# Patient Record
Sex: FEMALE | Race: WHITE | Employment: FULL TIME | URBAN - NONMETROPOLITAN AREA
[De-identification: names, ages, dates, MRNs, and addresses within clinical notes are randomized per-mention and may not be internally consistent; named-entity substitution may affect disease eponyms.]

---

## 2017-02-08 ENCOUNTER — OFFICE VISIT (OUTPATIENT)
Dept: FAMILY MEDICINE CLINIC | Age: 37
End: 2017-02-08

## 2017-02-08 VITALS
BODY MASS INDEX: 26.56 KG/M2 | DIASTOLIC BLOOD PRESSURE: 66 MMHG | SYSTOLIC BLOOD PRESSURE: 116 MMHG | WEIGHT: 136 LBS | RESPIRATION RATE: 17 BRPM | HEART RATE: 100 BPM

## 2017-02-08 DIAGNOSIS — H65.04 RECURRENT ACUTE SEROUS OTITIS MEDIA OF RIGHT EAR: ICD-10-CM

## 2017-02-08 DIAGNOSIS — G56.01 CARPAL TUNNEL SYNDROME OF RIGHT WRIST: ICD-10-CM

## 2017-02-08 DIAGNOSIS — R51.9 SEVERE HEADACHE: ICD-10-CM

## 2017-02-08 DIAGNOSIS — M54.2 NECK PAIN: Primary | ICD-10-CM

## 2017-02-08 DIAGNOSIS — Z72.0 TOBACCO USE: ICD-10-CM

## 2017-02-08 PROCEDURE — 99214 OFFICE O/P EST MOD 30 MIN: CPT | Performed by: FAMILY MEDICINE

## 2017-02-08 RX ORDER — OMEGA-3/DHA/EPA/FISH OIL 60 MG-90MG
CAPSULE ORAL
COMMUNITY

## 2017-02-08 RX ORDER — CHOLECALCIFEROL (VITAMIN D3) 125 MCG
500 CAPSULE ORAL DAILY
COMMUNITY
End: 2018-03-27

## 2017-02-08 RX ORDER — TRAMADOL HYDROCHLORIDE 50 MG/1
50 TABLET ORAL EVERY 6 HOURS PRN
Qty: 60 TABLET | Refills: 2 | Status: SHIPPED | OUTPATIENT
Start: 2017-02-08 | End: 2017-05-31 | Stop reason: SDUPTHER

## 2017-02-08 RX ORDER — FLUTICASONE PROPIONATE 50 MCG
2 SPRAY, SUSPENSION (ML) NASAL DAILY
Qty: 1 BOTTLE | Refills: 3 | Status: SHIPPED | OUTPATIENT
Start: 2017-02-08 | End: 2018-03-27

## 2017-02-08 ASSESSMENT — ENCOUNTER SYMPTOMS
ABDOMINAL PAIN: 0
CONSTIPATION: 0
WHEEZING: 0
COUGH: 0
BACK PAIN: 0
SORE THROAT: 0
SINUS PRESSURE: 0
CHEST TIGHTNESS: 0
NAUSEA: 0
SHORTNESS OF BREATH: 0
DIARRHEA: 0
VOMITING: 0
BLOOD IN STOOL: 0
RHINORRHEA: 0

## 2017-05-31 ENCOUNTER — OFFICE VISIT (OUTPATIENT)
Dept: FAMILY MEDICINE CLINIC | Age: 37
End: 2017-05-31

## 2017-05-31 VITALS
DIASTOLIC BLOOD PRESSURE: 68 MMHG | RESPIRATION RATE: 15 BRPM | SYSTOLIC BLOOD PRESSURE: 116 MMHG | BODY MASS INDEX: 28.43 KG/M2 | HEART RATE: 64 BPM | HEIGHT: 59 IN | WEIGHT: 141 LBS

## 2017-05-31 DIAGNOSIS — Z11.4 SCREENING FOR HIV WITHOUT PRESENCE OF RISK FACTORS: ICD-10-CM

## 2017-05-31 DIAGNOSIS — R51.9 SEVERE HEADACHE: ICD-10-CM

## 2017-05-31 DIAGNOSIS — G25.81 RESTLESS LEG SYNDROME: ICD-10-CM

## 2017-05-31 DIAGNOSIS — Z11.1 ENCOUNTER FOR PPD TEST: ICD-10-CM

## 2017-05-31 DIAGNOSIS — M54.2 NECK PAIN: Primary | ICD-10-CM

## 2017-05-31 DIAGNOSIS — Z23 NEED FOR PROPHYLACTIC VACCINATION AGAINST DIPHTHERIA-TETANUS-PERTUSSIS (DTP): ICD-10-CM

## 2017-05-31 PROCEDURE — 90471 IMMUNIZATION ADMIN: CPT | Performed by: FAMILY MEDICINE

## 2017-05-31 PROCEDURE — 90715 TDAP VACCINE 7 YRS/> IM: CPT | Performed by: FAMILY MEDICINE

## 2017-05-31 PROCEDURE — 86580 TB INTRADERMAL TEST: CPT | Performed by: FAMILY MEDICINE

## 2017-05-31 PROCEDURE — 99214 OFFICE O/P EST MOD 30 MIN: CPT | Performed by: FAMILY MEDICINE

## 2017-05-31 RX ORDER — TRAMADOL HYDROCHLORIDE 50 MG/1
50 TABLET ORAL EVERY 6 HOURS PRN
Qty: 60 TABLET | Refills: 2 | Status: SHIPPED | OUTPATIENT
Start: 2017-05-31 | End: 2021-05-18

## 2017-05-31 RX ORDER — ROPINIROLE 0.5 MG/1
0.5 TABLET, FILM COATED ORAL NIGHTLY
Qty: 30 TABLET | Refills: 11 | Status: SHIPPED | OUTPATIENT
Start: 2017-05-31 | End: 2021-05-18

## 2017-05-31 ASSESSMENT — ENCOUNTER SYMPTOMS
BLOOD IN STOOL: 0
CONSTIPATION: 0
BACK PAIN: 0
WHEEZING: 0
CHEST TIGHTNESS: 0
VOMITING: 0
SORE THROAT: 0
ABDOMINAL PAIN: 0
COUGH: 0
SINUS PRESSURE: 0
NAUSEA: 0
DIARRHEA: 0
SHORTNESS OF BREATH: 0
RHINORRHEA: 0

## 2017-08-24 ENCOUNTER — HOSPITAL ENCOUNTER (OUTPATIENT)
Dept: LAB | Age: 37
Discharge: HOME | End: 2017-08-24
Attending: NURSE PRACTITIONER | Admitting: NURSE PRACTITIONER

## 2017-08-24 LAB
ALT SERPL-CCNC: 14 U/L (ref 10–40)
ANION GAP SERPL CALCULATED.3IONS-SCNC: 7 MMOL/L (ref 4–16)
BASOPHILS ABSOLUTE: 0 K/CU MM
BASOPHILS RELATIVE PERCENT: 0.2 % (ref 0–1)
BUN BLDV-MCNC: 15 MG/DL (ref 6–23)
CALCIUM SERPL-MCNC: 9.2 MG/DL (ref 8.3–10.6)
CHLORIDE BLD-SCNC: 106 MMOL/L (ref 99–110)
CHOLESTEROL, FASTING: 209 MG/DL
CO2: 26 MMOL/L (ref 21–32)
CREAT SERPL-MCNC: 0.7 MG/DL (ref 0.6–1.1)
DIFFERENTIAL TYPE: ABNORMAL
EOSINOPHILS ABSOLUTE: 0.3 K/CU MM
EOSINOPHILS RELATIVE PERCENT: 3.2 % (ref 0–3)
ESTIMATED AVERAGE GLUCOSE: 97 MG/DL
GFR AFRICAN AMERICAN: >60 ML/MIN/1.73M2
GFR NON-AFRICAN AMERICAN: >60 ML/MIN/1.73M2
GLUCOSE FASTING: 94 MG/DL (ref 70–99)
HBA1C MFR BLD: 5 % (ref 4.2–6.3)
HCT VFR BLD CALC: 42.5 % (ref 37–47)
HDLC SERPL-MCNC: 74 MG/DL
HEMOGLOBIN: 14.2 GM/DL (ref 12.5–16)
IMMATURE NEUTROPHIL %: 0.9 % (ref 0–0.43)
LDL CHOLESTEROL DIRECT: 113 MG/DL
LYMPHOCYTES ABSOLUTE: 2.7 K/CU MM
LYMPHOCYTES RELATIVE PERCENT: 31.5 % (ref 24–44)
MCH RBC QN AUTO: 29.8 PG (ref 27–31)
MCHC RBC AUTO-ENTMCNC: 33.4 % (ref 32–36)
MCV RBC AUTO: 89.1 FL (ref 78–100)
MONOCYTES ABSOLUTE: 0.5 K/CU MM
MONOCYTES RELATIVE PERCENT: 5.7 % (ref 0–4)
PDW BLD-RTO: 12.8 % (ref 11.7–14.9)
PLATELET # BLD: 322 K/CU MM (ref 140–440)
PMV BLD AUTO: 10.7 FL (ref 7.5–11.1)
POTASSIUM SERPL-SCNC: 4.3 MMOL/L (ref 3.5–5.1)
RBC # BLD: 4.77 M/CU MM (ref 4.2–5.4)
SEGMENTED NEUTROPHILS ABSOLUTE COUNT: 4.9 K/CU MM
SEGMENTED NEUTROPHILS RELATIVE PERCENT: 58.5 % (ref 36–66)
SODIUM BLD-SCNC: 139 MMOL/L (ref 135–145)
TOTAL IMMATURE NEUTOROPHIL: 0.08 K/CU MM
TRIGLYCERIDE, FASTING: 108 MG/DL
TSH HIGH SENSITIVITY: 1.49 UIU/ML (ref 0.27–4.2)
VITAMIN D 25-HYDROXY: 37.11 NG/ML
WBC # BLD: 8.5 K/CU MM (ref 4–10.5)

## 2018-01-26 ENCOUNTER — HOSPITAL ENCOUNTER (OUTPATIENT)
Dept: MRI IMAGING | Age: 38
Discharge: HOME | End: 2018-02-24
Attending: EMERGENCY MEDICINE | Admitting: EMERGENCY MEDICINE

## 2018-01-26 DIAGNOSIS — M54.12 RADICULOPATHY OF CERVICAL REGION: ICD-10-CM

## 2018-05-21 ENCOUNTER — HOSPITAL ENCOUNTER (OUTPATIENT)
Dept: ULTRASOUND IMAGING | Age: 38
Discharge: HOME | End: 2018-05-21
Attending: NURSE PRACTITIONER | Admitting: NURSE PRACTITIONER

## 2018-05-21 DIAGNOSIS — N20.0 CALCULUS OF KIDNEY: ICD-10-CM

## 2018-09-26 PROBLEM — Z11.1 ENCOUNTER FOR PPD TEST: Status: RESOLVED | Noted: 2017-05-31 | Resolved: 2018-09-26

## 2021-05-18 ENCOUNTER — OFFICE VISIT (OUTPATIENT)
Dept: INTERNAL MEDICINE CLINIC | Age: 41
End: 2021-05-18
Payer: COMMERCIAL

## 2021-05-18 VITALS
DIASTOLIC BLOOD PRESSURE: 68 MMHG | HEIGHT: 61 IN | WEIGHT: 162.2 LBS | HEART RATE: 82 BPM | TEMPERATURE: 99 F | BODY MASS INDEX: 30.62 KG/M2 | SYSTOLIC BLOOD PRESSURE: 102 MMHG | OXYGEN SATURATION: 97 %

## 2021-05-18 DIAGNOSIS — E78.5 MILD HYPERLIPIDEMIA: ICD-10-CM

## 2021-05-18 DIAGNOSIS — M54.2 NECK PAIN: Primary | ICD-10-CM

## 2021-05-18 DIAGNOSIS — G25.81 RESTLESS LEG SYNDROME: ICD-10-CM

## 2021-05-18 DIAGNOSIS — R63.5 WEIGHT GAIN: ICD-10-CM

## 2021-05-18 DIAGNOSIS — R53.83 FATIGUE, UNSPECIFIED TYPE: ICD-10-CM

## 2021-05-18 DIAGNOSIS — M79.605 LEFT LEG PAIN: ICD-10-CM

## 2021-05-18 DIAGNOSIS — R51.9 SEVERE HEADACHE: ICD-10-CM

## 2021-05-18 DIAGNOSIS — Z12.31 VISIT FOR SCREENING MAMMOGRAM: ICD-10-CM

## 2021-05-18 PROBLEM — G56.01 CARPAL TUNNEL SYNDROME OF RIGHT WRIST: Status: RESOLVED | Noted: 2017-02-08 | Resolved: 2021-05-18

## 2021-05-18 PROCEDURE — 99203 OFFICE O/P NEW LOW 30 MIN: CPT | Performed by: INTERNAL MEDICINE

## 2021-05-18 ASSESSMENT — PATIENT HEALTH QUESTIONNAIRE - PHQ9
SUM OF ALL RESPONSES TO PHQ QUESTIONS 1-9: 0
SUM OF ALL RESPONSES TO PHQ QUESTIONS 1-9: 0
1. LITTLE INTEREST OR PLEASURE IN DOING THINGS: 0

## 2021-05-18 NOTE — PROGRESS NOTES
through XII are grossly intact. IMPRESSION:    Encounter Diagnoses   Name Primary?  Neck pain Yes    Restless leg syndrome     Mild hyperlipidemia     Weight gain     Fatigue, unspecified type     Visit for screening mammogram     Severe headache     Left leg pain        ASSESSMENT/PLAN:    Neck pain  States she has cervical disc disease. Injured at work patient fell and now has neck and back injury. MRIs show disc disease. Patient is seeing chiropractor and getting treatment for that. Restless leg syndrome  Patient has restless leg syndrome but is doing well and is not on any medications    Severe headache  Patient used to have headaches after neck pain now the pain is in control. Mild hyperlipidemia  Patient has mild hyperlipidemia in the past.  Will check lipid profile    Left leg pain  Patient has radicular type pain in the left leg. No back pain. Patient is seeing chiropractor and is benefiting from him    Patient complains of fatigue also will get CBC and a TSH    Preventive medicine discussed with patient. Will get mammogram.  Patient was advised to see gynecologist.    Return to office in 1 month to discuss the results     Orders Placed This Encounter   Procedures    TREASURE DIGITAL SCREEN W OR WO CAD BILATERAL    Comprehensive Metabolic Panel    Lipid, Fasting    TSH with Reflex    CBC Auto Differential     RTO in 1 month      Mediations reviewed with the patient. Continue current medications. Appropriate prescriptions are addressed. After visit summeryprovided. Follow up as directed sooner if needed. Questions answered and patient verbalizes understanding. Call for any problems, questions, or concerns. No Known Allergies  Current Outpatient Medications   Medication Sig Dispense Refill    Omega-3 Fatty Acids (FISH OIL) 500 MG CAPS Take by mouth       No current facility-administered medications for this visit.      Past Medical History:   Diagnosis Date    Allergic rhinitis     Anxiety     Chronic back pain     Endometriosis      Past Surgical History:   Procedure Laterality Date    DILATION AND CURETTAGE OF UTERUS      LEEP      PELVIC LAPAROSCOPY       Social History     Tobacco Use    Smoking status: Former Smoker     Packs/day: 0.50    Smokeless tobacco: Never Used   Substance Use Topics    Alcohol use:  Yes     Alcohol/week: 0.0 standard drinks     Comment: \"rarely\"       LAB REVIEW:  CBC:   Lab Results   Component Value Date    WBC 8.5 08/24/2017    HGB 14.2 08/24/2017    HCT 42.5 08/24/2017     08/24/2017     Lipids:   Lab Results   Component Value Date    HDL 74 08/24/2017    LDLDIRECT 113 (H) 08/24/2017    TRIGLYCFAST 108 08/24/2017    CHOLFAST 209 (H) 08/24/2017     Renal:   Lab Results   Component Value Date    BUN 15 08/24/2017    CREATININE 0.7 08/24/2017     08/24/2017    K 4.3 08/24/2017    ALT 14 08/24/2017    AST 23 05/10/2016    GLUF 94 08/24/2017     PT/INR: No results found for: INR  A1C:   Lab Results   Component Value Date    LABA1C 5.0 08/24/2017           Andrew Quezada MD, 5/18/2021 , 2:53 PM

## 2021-05-18 NOTE — ASSESSMENT & PLAN NOTE
Patient has radicular type pain in the left leg. No back pain.   Patient is seeing chiropractor and is benefiting from him

## 2021-05-18 NOTE — ASSESSMENT & PLAN NOTE
States she has cervical disc disease. Injured at work patient fell and now has neck and back injury. MRIs show disc disease. Patient is seeing chiropractor and getting treatment for that.

## 2021-05-25 ENCOUNTER — HOSPITAL ENCOUNTER (OUTPATIENT)
Age: 41
Discharge: HOME | End: 2021-05-25
Payer: COMMERCIAL

## 2021-05-25 LAB
ALBUMIN SERPL-MCNC: 4.5 GM/DL (ref 3.4–5)
ALP BLD-CCNC: 64 IU/L (ref 40–129)
ALT SERPL-CCNC: 15 U/L (ref 10–40)
ANION GAP SERPL CALCULATED.3IONS-SCNC: 12 MMOL/L (ref 4–16)
AST SERPL-CCNC: 17 IU/L (ref 15–37)
BASOPHILS ABSOLUTE: 0.1 K/CU MM
BASOPHILS RELATIVE PERCENT: 0.4 % (ref 0–1)
BILIRUB SERPL-MCNC: 0.5 MG/DL (ref 0–1)
BUN BLDV-MCNC: 12 MG/DL (ref 6–23)
CALCIUM SERPL-MCNC: 9.3 MG/DL (ref 8.3–10.6)
CHLORIDE BLD-SCNC: 103 MMOL/L (ref 99–110)
CHOLESTEROL, FASTING: 239 MG/DL
CO2: 20 MMOL/L (ref 21–32)
CREAT SERPL-MCNC: 0.6 MG/DL (ref 0.6–1.1)
DIFFERENTIAL TYPE: ABNORMAL
EOSINOPHILS ABSOLUTE: 0.2 K/CU MM
EOSINOPHILS RELATIVE PERCENT: 1.5 % (ref 0–3)
GFR AFRICAN AMERICAN: >60 ML/MIN/1.73M2
GFR NON-AFRICAN AMERICAN: >60 ML/MIN/1.73M2
GLUCOSE FASTING: 86 MG/DL (ref 70–99)
HCT VFR BLD CALC: 40 % (ref 37–47)
HDLC SERPL-MCNC: 51 MG/DL
HEMOGLOBIN: 13.7 GM/DL (ref 12.5–16)
IMMATURE NEUTROPHIL %: 0.5 % (ref 0–0.43)
LDL CHOLESTEROL DIRECT: 176 MG/DL
LYMPHOCYTES ABSOLUTE: 3.7 K/CU MM
LYMPHOCYTES RELATIVE PERCENT: 27.1 % (ref 24–44)
MCH RBC QN AUTO: 30 PG (ref 27–31)
MCHC RBC AUTO-ENTMCNC: 34.3 % (ref 32–36)
MCV RBC AUTO: 87.5 FL (ref 78–100)
MONOCYTES ABSOLUTE: 0.8 K/CU MM
MONOCYTES RELATIVE PERCENT: 5.7 % (ref 0–4)
PDW BLD-RTO: 12.3 % (ref 11.7–14.9)
PLATELET # BLD: 319 K/CU MM (ref 140–440)
PMV BLD AUTO: 10.5 FL (ref 7.5–11.1)
POTASSIUM SERPL-SCNC: 4.2 MMOL/L (ref 3.5–5.1)
RBC # BLD: 4.57 M/CU MM (ref 4.2–5.4)
SEGMENTED NEUTROPHILS ABSOLUTE COUNT: 8.8 K/CU MM
SEGMENTED NEUTROPHILS RELATIVE PERCENT: 64.8 % (ref 36–66)
SODIUM BLD-SCNC: 135 MMOL/L (ref 135–145)
TOTAL IMMATURE NEUTOROPHIL: 0.07 K/CU MM
TOTAL PROTEIN: 6.7 GM/DL (ref 6.4–8.2)
TRIGLYCERIDE, FASTING: 116 MG/DL
TSH HIGH SENSITIVITY: 0.93 UIU/ML (ref 0.27–4.2)
WBC # BLD: 13.6 K/CU MM (ref 4–10.5)

## 2021-05-25 PROCEDURE — 85025 COMPLETE CBC W/AUTO DIFF WBC: CPT

## 2021-05-25 PROCEDURE — 80061 LIPID PANEL: CPT

## 2021-05-25 PROCEDURE — 36415 COLL VENOUS BLD VENIPUNCTURE: CPT

## 2021-05-25 PROCEDURE — 84443 ASSAY THYROID STIM HORMONE: CPT

## 2021-05-25 PROCEDURE — 80053 COMPREHEN METABOLIC PANEL: CPT

## 2021-06-15 ENCOUNTER — OFFICE VISIT (OUTPATIENT)
Dept: INTERNAL MEDICINE CLINIC | Age: 41
End: 2021-06-15
Payer: COMMERCIAL

## 2021-06-15 VITALS
RESPIRATION RATE: 16 BRPM | OXYGEN SATURATION: 98 % | HEART RATE: 76 BPM | TEMPERATURE: 98.4 F | SYSTOLIC BLOOD PRESSURE: 104 MMHG | BODY MASS INDEX: 29.97 KG/M2 | WEIGHT: 156 LBS | DIASTOLIC BLOOD PRESSURE: 68 MMHG

## 2021-06-15 DIAGNOSIS — D72.829 LEUKOCYTOSIS, UNSPECIFIED TYPE: Primary | ICD-10-CM

## 2021-06-15 DIAGNOSIS — R10.10 UPPER ABDOMINAL PAIN: ICD-10-CM

## 2021-06-15 DIAGNOSIS — E78.2 MIXED HYPERLIPIDEMIA: ICD-10-CM

## 2021-06-15 DIAGNOSIS — E66.3 OVER WEIGHT: ICD-10-CM

## 2021-06-15 PROCEDURE — 99213 OFFICE O/P EST LOW 20 MIN: CPT | Performed by: INTERNAL MEDICINE

## 2021-06-15 NOTE — PROGRESS NOTES
significant edema. SKIN: Skin is warm and dry. NEUROLOGICAL:  Cranial nerves II through XII are grossly intact. IMPRESSION:    Encounter Diagnoses   Name Primary?  Leukocytosis, unspecified type Yes    Mixed hyperlipidemia     Upper abdominal pain     Over weight          ASSESSMENT/PLAN:    Leukocytosis and mild monocytosis : recheck CBCD  HLD : follow low chol diet x 3 months . Pt's choice. Recheck lipid in 3 months  Weight gain : pt is following intermitent fasting. Lost few lbs. Continue same  Left Upper quadrant pain. No tenderness in that area. No masses. prilosec 20 mg daily x 1 month. Recheck after that. If pain not resolved RTO sooner. Orders Placed This Encounter   Procedures    CBC WITH MANUAL DIFFERENTIAL    Lipid, Fasting     Return to office in 3 months. Mediations reviewed with the patient. Continue current medications. Appropriate prescriptions are addressed. After visit summeryprovided. Follow up as directed sooner if needed. Questions answered and patient verbalizes understanding. Call for any problems, questions, or concerns. No Known Allergies  Current Outpatient Medications   Medication Sig Dispense Refill    Multiple Vitamin (MULTIVITAMIN ADULT PO) Take 2 tablets by mouth daily       Omega-3 Fatty Acids (FISH OIL) 500 MG CAPS Take by mouth       No current facility-administered medications for this visit. Past Medical History:   Diagnosis Date    Allergic rhinitis     Anxiety     Chronic back pain     Endometriosis      Past Surgical History:   Procedure Laterality Date    DILATION AND CURETTAGE OF UTERUS      LEEP      PELVIC LAPAROSCOPY       Social History     Tobacco Use    Smoking status: Former Smoker     Packs/day: 0.50    Smokeless tobacco: Never Used   Substance Use Topics    Alcohol use:  Yes     Alcohol/week: 0.0 standard drinks     Comment: \"rarely\"       LAB REVIEW:  CBC:   Lab Results   Component Value Date    WBC 13.6 05/25/2021 HGB 13.7 05/25/2021    HCT 40.0 05/25/2021     05/25/2021     Lipids:   Lab Results   Component Value Date    HDL 51 05/25/2021    LDLDIRECT 176 (H) 05/25/2021    TRIGLYCFAST 116 05/25/2021    CHOLFAST 239 (H) 05/25/2021     Renal:   Lab Results   Component Value Date    BUN 12 05/25/2021    CREATININE 0.6 05/25/2021     05/25/2021    K 4.2 05/25/2021    ALT 15 05/25/2021    AST 17 05/25/2021    GLUF 86 05/25/2021     PT/INR: No results found for: INR  A1C:   Lab Results   Component Value Date    LABA1C 5.0 08/24/2017           Andrew Quezada MD, 6/15/2021 , 2:10 PM

## 2021-06-21 PROBLEM — R10.10 UPPER ABDOMINAL PAIN: Status: ACTIVE | Noted: 2021-06-21

## 2021-06-21 PROBLEM — E66.3 OVER WEIGHT: Status: ACTIVE | Noted: 2021-06-21

## 2021-06-21 ASSESSMENT — ENCOUNTER SYMPTOMS
EYES NEGATIVE: 1
DIARRHEA: 0
COUGH: 0
ABDOMINAL PAIN: 1
ALLERGIC/IMMUNOLOGIC NEGATIVE: 1
SHORTNESS OF BREATH: 0
RESPIRATORY NEGATIVE: 1
ABDOMINAL DISTENTION: 0
BLOOD IN STOOL: 0
ANAL BLEEDING: 0

## 2021-07-29 ENCOUNTER — HOSPITAL ENCOUNTER (OUTPATIENT)
Age: 41
Discharge: HOME | End: 2021-07-29
Payer: COMMERCIAL

## 2021-07-29 LAB
BASOPHILS ABSOLUTE: 0.1 K/CU MM
BASOPHILS RELATIVE PERCENT: 0.5 % (ref 0–1)
DIFFERENTIAL TYPE: ABNORMAL
EOSINOPHILS ABSOLUTE: 0.3 K/CU MM
EOSINOPHILS RELATIVE PERCENT: 1.8 % (ref 0–3)
HCT VFR BLD CALC: 39.5 % (ref 37–47)
HEMOGLOBIN: 13.1 GM/DL (ref 12.5–16)
IMMATURE NEUTROPHIL %: 0.6 % (ref 0–0.43)
LYMPHOCYTES ABSOLUTE: 3.7 K/CU MM
LYMPHOCYTES RELATIVE PERCENT: 26.3 % (ref 24–44)
MCH RBC QN AUTO: 30.1 PG (ref 27–31)
MCHC RBC AUTO-ENTMCNC: 33.2 % (ref 32–36)
MCV RBC AUTO: 90.8 FL (ref 78–100)
MONOCYTES ABSOLUTE: 0.8 K/CU MM
MONOCYTES RELATIVE PERCENT: 5.5 % (ref 0–4)
PDW BLD-RTO: 13 % (ref 11.7–14.9)
PLATELET # BLD: 384 K/CU MM (ref 140–440)
PMV BLD AUTO: 9.7 FL (ref 7.5–11.1)
RBC # BLD: 4.35 M/CU MM (ref 4.2–5.4)
SEGMENTED NEUTROPHILS ABSOLUTE COUNT: 9.3 K/CU MM
SEGMENTED NEUTROPHILS RELATIVE PERCENT: 65.3 % (ref 36–66)
TOTAL IMMATURE NEUTOROPHIL: 0.08 K/CU MM
WBC # BLD: 14.2 K/CU MM (ref 4–10.5)

## 2021-07-29 PROCEDURE — 36415 COLL VENOUS BLD VENIPUNCTURE: CPT

## 2021-07-29 PROCEDURE — 85025 COMPLETE CBC W/AUTO DIFF WBC: CPT

## 2021-08-02 ENCOUNTER — TELEPHONE (OUTPATIENT)
Dept: INTERNAL MEDICINE CLINIC | Age: 41
End: 2021-08-02

## 2021-08-02 DIAGNOSIS — D72.829 LEUKOCYTOSIS, UNSPECIFIED TYPE: ICD-10-CM

## 2021-08-02 DIAGNOSIS — R00.2 PALPITATION: Primary | ICD-10-CM

## 2021-08-02 NOTE — TELEPHONE ENCOUNTER
Patient called today for the blood test results  WBC elevated 14,200, monocytes slightly elevated. Previous white count was also elevated. Patient is concerned about that. Will refer patient to Dr. Tc Pedroza, patient's preference    Patient states she has increased anxiety and sometimes has palpitations she is worried about her heart. We will get an EKG.   If symptoms get worse she should return to office

## 2021-08-03 ENCOUNTER — TELEPHONE (OUTPATIENT)
Dept: INTERNAL MEDICINE CLINIC | Age: 41
End: 2021-08-03

## 2021-08-03 ENCOUNTER — OFFICE VISIT (OUTPATIENT)
Dept: INTERNAL MEDICINE CLINIC | Age: 41
End: 2021-08-03
Payer: COMMERCIAL

## 2021-08-03 VITALS
WEIGHT: 154.4 LBS | TEMPERATURE: 98.7 F | HEART RATE: 72 BPM | OXYGEN SATURATION: 97 % | RESPIRATION RATE: 16 BRPM | SYSTOLIC BLOOD PRESSURE: 118 MMHG | DIASTOLIC BLOOD PRESSURE: 68 MMHG | BODY MASS INDEX: 29.66 KG/M2

## 2021-08-03 DIAGNOSIS — R00.2 PALPITATION: ICD-10-CM

## 2021-08-03 DIAGNOSIS — R00.2 PALPITATIONS: Primary | ICD-10-CM

## 2021-08-03 DIAGNOSIS — I45.6 SHORT PR-NORMAL QRS COMPLEX SYNDROME: ICD-10-CM

## 2021-08-03 PROCEDURE — 93000 ELECTROCARDIOGRAM COMPLETE: CPT | Performed by: INTERNAL MEDICINE

## 2021-08-03 PROCEDURE — 99213 OFFICE O/P EST LOW 20 MIN: CPT | Performed by: INTERNAL MEDICINE

## 2021-08-03 ASSESSMENT — PATIENT HEALTH QUESTIONNAIRE - PHQ9
SUM OF ALL RESPONSES TO PHQ QUESTIONS 1-9: 0
SUM OF ALL RESPONSES TO PHQ QUESTIONS 1-9: 0
SUM OF ALL RESPONSES TO PHQ9 QUESTIONS 1 & 2: 0
1. LITTLE INTEREST OR PLEASURE IN DOING THINGS: 0
2. FEELING DOWN, DEPRESSED OR HOPELESS: 0
SUM OF ALL RESPONSES TO PHQ QUESTIONS 1-9: 0

## 2021-08-03 NOTE — TELEPHONE ENCOUNTER
Faxed referral to Dr. Harini Michelle, the office will call the patient with an appointment. Confirmation of fax received.

## 2021-08-03 NOTE — PROGRESS NOTES
[facility]  Patient's  is 1980  Seen in office on 8/3/2021      SUBJECTIVE:  Radha lilly 38 y. o.year old female presents today   Chief Complaint   Patient presents with    Palpitations     felt like a panic attack yesterday, had energy drink yesterday too    Fatigue     for several months    Jaw Pain     possible abcess tooth     Pt has panic attack yesterday per pt  She felt anxious , had palpitation and clammy  She pulled car [place] [address] she felt better, and then went to work and felt palpitation again and rested in back room for 25 minutes . She had some tightness in the chest  No chest pain. No SOB. No HA. No NVD  Episodes of chest tightness in the past.     Had tooth pain on left side. She has some pain when she is chewing  Taking medications regularly. No side effects noted. Review of Systems    OBJECTIVE: /68   Pulse 72   Temp 98.7 °F (37.1 °C)   Resp 16   Wt 154 lb 6.4 oz (70 kg)   LMP 2021 (Exact Date)   SpO2 97%   BMI 29.66 kg/m²     Wt Readings from Last 3 Encounters:   21 154 lb 6.4 oz (70 kg)   06/15/21 156 lb (70.8 kg)   21 162 lb 3.2 oz (73.6 kg)      Patient was seen taking COVID-19 precautions. Face mask, gloves were used. Patient also wore facemask. GENERAL:  Alert, oriented, pleasant, in no apparent distress. HEENT:  Conjunctiva pink, no scleral icterus. ENT clear. Tenderness in the left maxillary area per patient  NECK: Supple. No jugular venous distention noted. No masses felt,  CARDIOVASCULAR:  Normal S1 and S2    PULMONARY:  No respiratory distress. No wheezes or rales. ABDOMEN:  Soft and non-tender,no masses  ororganomegaly. EXTREMITIES:  No cyanosis, clubbing, or significant edema. SKIN: Skin is warm and dry. NEUROLOGICAL:  Cranial nerves II through XII are grossly intact. IMPRESSION:    Encounter Diagnoses   Name Primary?     Palpitations Yes    Palpitation     Short OK-normal QRS complex syndrome        ASSESSMENT/PLAN:    Anxiety : observe for now. Does not want any med. Palpitations and chest tightness. Refer to cardiologist  Short OK syndrome : on EKG ? Refer to cardiologist because of palpitation  Wbc elevation : refer to Dr Josh Ramon with pt in detail     RTO in 1 month    Mediations reviewed with the patient. Continue current medications. Appropriate prescriptions are addressed. After visit summeryprovided. Follow up as directed sooner if needed. Questions answered and patient verbalizes understanding. Call for any problems, questions, or concerns. No Known Allergies  Current Outpatient Medications   Medication Sig Dispense Refill    Multiple Vitamin (MULTIVITAMIN ADULT PO) Take 2 tablets by mouth daily       Omega-3 Fatty Acids (FISH OIL) 500 MG CAPS Take by mouth       No current facility-administered medications for this visit. Past Medical History:   Diagnosis Date    Allergic rhinitis     Anxiety     Chronic back pain     Endometriosis      Past Surgical History:   Procedure Laterality Date    DILATION AND CURETTAGE OF UTERUS      LEEP      PELVIC LAPAROSCOPY       Social History     Tobacco Use    Smoking status: Former Smoker     Packs/day: 0.50    Smokeless tobacco: Never Used   Substance Use Topics    Alcohol use:  Yes     Alcohol/week: 0.0 standard drinks     Comment: \"rarely\"       LAB REVIEW:  CBC:   Lab Results   Component Value Date    WBC 14.2 07/29/2021    HGB 13.1 07/29/2021    HCT 39.5 07/29/2021     07/29/2021     Lipids:   Lab Results   Component Value Date    HDL 51 05/25/2021    LDLDIRECT 176 (H) 05/25/2021    TRIGLYCFAST 116 05/25/2021    CHOLFAST 239 (H) 05/25/2021     Renal:   Lab Results   Component Value Date    BUN 12 05/25/2021    CREATININE 0.6 05/25/2021     05/25/2021    K 4.2 05/25/2021    ALT 15 05/25/2021    AST 17 05/25/2021    GLUF 86 05/25/2021     PT/INR: No results found for: INR  A1C: Lab Results   Component Value Date    LABA1C 5.0 08/24/2017           Savi Dela Cruz MD, 8/3/2021 , 2:24 PM

## 2021-08-11 ENCOUNTER — NURSE ONLY (OUTPATIENT)
Dept: CARDIOLOGY CLINIC | Age: 41
End: 2021-08-11
Payer: COMMERCIAL

## 2021-08-11 ENCOUNTER — INITIAL CONSULT (OUTPATIENT)
Dept: CARDIOLOGY CLINIC | Age: 41
End: 2021-08-11
Payer: COMMERCIAL

## 2021-08-11 VITALS
HEIGHT: 61 IN | HEART RATE: 98 BPM | DIASTOLIC BLOOD PRESSURE: 70 MMHG | SYSTOLIC BLOOD PRESSURE: 116 MMHG | BODY MASS INDEX: 29.45 KG/M2 | WEIGHT: 156 LBS

## 2021-08-11 DIAGNOSIS — R06.02 SHORTNESS OF BREATH: Primary | ICD-10-CM

## 2021-08-11 DIAGNOSIS — R06.02 SOB (SHORTNESS OF BREATH): Primary | ICD-10-CM

## 2021-08-11 PROCEDURE — 99204 OFFICE O/P NEW MOD 45 MIN: CPT | Performed by: INTERNAL MEDICINE

## 2021-08-11 NOTE — PROGRESS NOTES
CARDIOLOGY CONSULT NOTE   Reason for consultation: palpitations    Referring physician:   Citlalli Grimm MD      Primary care physician: Citlalli Grimm MD      Dear  Citlalli Grimm MD    Thanks for the consult. History of present illness:Radha is a 38 y. o.year old who  presents with chest pain while driviing, had palpitations and pounding while driving last week, its intermittent, severe in intensity, pounding like duration is for 2 hrs, happens while sitting,associated with shortness of breath, chest pain although has some dizziness. She had 1/2 of energy drink that day, she was fatigued for a months, her wbc was elevated, not sure if she has infectious mononucleosis, she will see hemtalogist    She has history of short OH interval on EKG  Chief Complaint   Patient presents with    New Patient     patient referred for abnormal EKG. she also has had palpitations and a dull ache x 1 month. she also struggles with anxiety and panic attacks. she had HLD, she smokes cigarettes and drinks 1 cup coffee daily. she has family history of cardiac issues. Blood pressure, cholesterol, blood glucose and weight are well controlled. Past medical history:    has a past medical history of Allergic rhinitis, Anxiety, Chronic back pain, Endometriosis, and Hyperlipidemia. Past surgical history:   has a past surgical history that includes Dilation and curettage of uterus; LEEP; and pelvic laparoscopy. Social History:   reports that she has been smoking. She has been smoking about 0.25 packs per day. She has never used smokeless tobacco. She reports current alcohol use. She reports that she does not use drugs. Family history:   no family history of CAD, STROKE of DM    No Known Allergies    No current facility-administered medications for this visit.     Current Outpatient Medications   Medication Sig Dispense Refill    Multiple Vitamin (MULTIVITAMIN ADULT PO) Take 2 tablets by mouth daily       Omega-3 Fatty Acids (FISH OIL) 500 MG CAPS Take by mouth       No current facility-administered medications for this visit. Review of Systems:   · Constitutional: No Fever or Weight Loss   · Eyes: No Decreased Vision  · ENT: No Headaches, Hearing Loss or Vertigo  · Cardiovascular: +chest pain,dyspnea on exertion,+ palpitations or loss of consciousness  · Respiratory: No cough or wheezing    · Gastrointestinal: No abdominal pain, appetite loss, blood in stools, constipation, diarrhea or heartburn  · Genitourinary: No dysuria, trouble voiding, or hematuria  · Musculoskeletal:  No gait disturbance, weakness or joint complaints  · Integumentary: No rash or pruritis  · Neurological: No TIA or stroke symptoms  · Psychiatric: No anxiety or depression  · Endocrine: No malaise, fatigue or temperature intolerance  · Hematologic/Lymphatic: No bleeding problems, blood clots or swollen lymph nodes  · Allergic/Immunologic: No nasal congestion or hives  All systems negative except as marked. ·   ·      Physical Examination:    Vitals:    08/11/21 1513   BP: 116/70   Pulse: 98    rr14  afebrile  Wt Readings from Last 3 Encounters:   08/11/21 156 lb (70.8 kg)   08/03/21 154 lb 6.4 oz (70 kg)   06/15/21 156 lb (70.8 kg)     Body mass index is 29.97 kg/m². General Appearance:  No distress, conversant    Constitutional:  Well developed, Well nourished, No acute distress, Non-toxic appearance. HENT:  Normocephalic, Atraumatic, Bilateral external ears normal, Oropharynx moist, No oral exudates, Nose normal. Neck- Normal range of motion, No tenderness, Supple, No stridor,no apical-carotid delay, no carotid bruit  Eyes:  PERRL, EOMI, Conjunctiva normal, No discharge. Respiratory:  Normal breath sounds, No respiratory distress, No wheezing, No chest tenderness. ,no use of accessory muscles, diaphragm movement is normal  Cardiovascular: (PMI) apex non displaced,no lifts no thrills, no s3,no s4, Normal heart rate, Normal rhythm, No murmurs, No rubs, No gallops. Carotid arteries pulse and amplitude are normal no bruit, no abdominal bruit noted ( normal abdominal aorta ausculation), femoral arteries pulse and amplitude are normal no bruit, pedal pulses are normal  GI:  Bowel sounds normal, Soft, No tenderness, No masses, No pulsatile masses, no hepatosplenomegally, no bruits  : External genitalia appear normal, No masses or lesions. No discharge. No CVA tenderness. Musculoskeletal:  Intact distal pulses, No edema, No tenderness, No cyanosis, No clubbing. Good range of motion in all major joints. No tenderness to palpation or major deformities noted. Back- No tenderness. Integument:  Warm, Dry, No erythema, No rash. Skin: no rash, no ulcers  Lymphatic:  No lymphadenopathy noted. Neurologic:  Alert & oriented x 3, Normal motor function, Normal sensory function, No focal deficits noted. Psychiatric:  Affect normal, Judgment normal, Mood normal.   Lab Review   No results for input(s): WBC, HGB, HCT, PLT in the last 72 hours. No results for input(s): NA, K, CL, CO2, PHOS, BUN, CREATININE in the last 72 hours. Invalid input(s): CA  No results for input(s): AST, ALT, ALB, BILIDIR, BILITOT, ALKPHOS in the last 72 hours. No results for input(s): TROPONINI in the last 72 hours. No results found for: BNP  No results found for: INR, PROTIME      EKG:nsr    Chest Xray:pejnding    ECHO:pending  Labs, echo, meds reviewed  Assessment: 38 y. o.year old with PMH of  has a past medical history of Allergic rhinitis, Anxiety, Chronic back pain, Endometriosis, and Hyperlipidemia. Recommendations:    1. Palpitations, R/O WPW, 30 days event monitor ordered, she has apple watch and some time her heart rate goes upto 168 bpm at rest, she has history of short AK segment, has slight slurring of R wave  2. Resolved Covid 19  3. Chest pain: stress test and echo ordered  4. Dyslpidemia\":continue fish oil  5.  Possible EBV, INFECTIOUS mono nucleosis:agree with hematolgy consult  6. Health maintenance: exerise and diet  All labs, medications and tests reviewed, continue all other medications of all above medical condition listed as is.          Chivo Woodruff MD, 8/11/2021 3:39 PM

## 2021-08-17 PROCEDURE — 93228 REMOTE 30 DAY ECG REV/REPORT: CPT | Performed by: INTERNAL MEDICINE

## 2021-08-30 ENCOUNTER — INITIAL CONSULT (OUTPATIENT)
Dept: ONCOLOGY | Age: 41
End: 2021-08-30
Payer: COMMERCIAL

## 2021-08-30 ENCOUNTER — HOSPITAL ENCOUNTER (OUTPATIENT)
Dept: INFUSION THERAPY | Age: 41
Discharge: HOME | End: 2021-08-30
Payer: COMMERCIAL

## 2021-08-30 VITALS
OXYGEN SATURATION: 99 % | HEIGHT: 59 IN | WEIGHT: 155.6 LBS | BODY MASS INDEX: 31.37 KG/M2 | TEMPERATURE: 96.6 F | HEART RATE: 71 BPM | DIASTOLIC BLOOD PRESSURE: 66 MMHG | SYSTOLIC BLOOD PRESSURE: 117 MMHG

## 2021-08-30 DIAGNOSIS — D72.828 OTHER ELEVATED WHITE BLOOD CELL (WBC) COUNT: ICD-10-CM

## 2021-08-30 PROBLEM — D72.829 LEUKOCYTOSIS (LEUCOCYTOSIS): Status: ACTIVE | Noted: 2021-08-30

## 2021-08-30 LAB
ALBUMIN SERPL-MCNC: 4.8 GM/DL (ref 3.4–5)
ALP BLD-CCNC: 80 IU/L (ref 40–129)
ALT SERPL-CCNC: 16 U/L (ref 10–40)
ANION GAP SERPL CALCULATED.3IONS-SCNC: 13 MMOL/L (ref 4–16)
AST SERPL-CCNC: 18 IU/L (ref 15–37)
BASOPHILS ABSOLUTE: 0 K/CU MM
BASOPHILS RELATIVE PERCENT: 0.2 % (ref 0–1)
BILIRUB SERPL-MCNC: 0.3 MG/DL (ref 0–1)
BUN BLDV-MCNC: 9 MG/DL (ref 6–23)
CALCIUM SERPL-MCNC: 9.8 MG/DL (ref 8.3–10.6)
CHLORIDE BLD-SCNC: 105 MMOL/L (ref 99–110)
CO2: 22 MMOL/L (ref 21–32)
CREAT SERPL-MCNC: 0.5 MG/DL (ref 0.6–1.1)
DIFFERENTIAL TYPE: ABNORMAL
EOSINOPHILS ABSOLUTE: 0.2 K/CU MM
EOSINOPHILS RELATIVE PERCENT: 1.5 % (ref 0–3)
ERYTHROCYTE SEDIMENTATION RATE: 3 MM/HR (ref 0–20)
GFR AFRICAN AMERICAN: >60 ML/MIN/1.73M2
GFR NON-AFRICAN AMERICAN: >60 ML/MIN/1.73M2
GLUCOSE BLD-MCNC: 85 MG/DL (ref 70–99)
HCT VFR BLD CALC: 40.7 % (ref 37–47)
HEMOGLOBIN: 14.1 GM/DL (ref 12.5–16)
LACTATE DEHYDROGENASE: 162 IU/L (ref 120–246)
LYMPHOCYTES ABSOLUTE: 3.4 K/CU MM
LYMPHOCYTES RELATIVE PERCENT: 28.9 % (ref 24–44)
MCH RBC QN AUTO: 30.5 PG (ref 27–31)
MCHC RBC AUTO-ENTMCNC: 34.6 % (ref 32–36)
MCV RBC AUTO: 88.1 FL (ref 78–100)
MONOCYTES ABSOLUTE: 0.7 K/CU MM
MONOCYTES RELATIVE PERCENT: 5.7 % (ref 0–4)
PDW BLD-RTO: 13 % (ref 11.7–14.9)
PLATELET # BLD: 380 K/CU MM (ref 140–440)
PMV BLD AUTO: 10 FL (ref 7.5–11.1)
POTASSIUM SERPL-SCNC: 4.4 MMOL/L (ref 3.5–5.1)
RBC # BLD: 4.62 M/CU MM (ref 4.2–5.4)
SEGMENTED NEUTROPHILS ABSOLUTE COUNT: 7.4 K/CU MM
SEGMENTED NEUTROPHILS RELATIVE PERCENT: 63.7 % (ref 36–66)
SODIUM BLD-SCNC: 140 MMOL/L (ref 135–145)
TOTAL PROTEIN: 7.2 GM/DL (ref 6.4–8.2)
WBC # BLD: 11.7 K/CU MM (ref 4–10.5)

## 2021-08-30 PROCEDURE — 99204 OFFICE O/P NEW MOD 45 MIN: CPT | Performed by: INTERNAL MEDICINE

## 2021-08-30 PROCEDURE — 83615 LACTATE (LD) (LDH) ENZYME: CPT

## 2021-08-30 PROCEDURE — 99202 OFFICE O/P NEW SF 15 MIN: CPT

## 2021-08-30 PROCEDURE — 86038 ANTINUCLEAR ANTIBODIES: CPT

## 2021-08-30 PROCEDURE — 85652 RBC SED RATE AUTOMATED: CPT

## 2021-08-30 PROCEDURE — 85025 COMPLETE CBC W/AUTO DIFF WBC: CPT

## 2021-08-30 PROCEDURE — 80053 COMPREHEN METABOLIC PANEL: CPT

## 2021-08-30 RX ORDER — ASPIRIN 81 MG/1
81 TABLET ORAL DAILY
COMMUNITY

## 2021-08-30 ASSESSMENT — PATIENT HEALTH QUESTIONNAIRE - PHQ9
SUM OF ALL RESPONSES TO PHQ QUESTIONS 1-9: 0
1. LITTLE INTEREST OR PLEASURE IN DOING THINGS: 0
SUM OF ALL RESPONSES TO PHQ9 QUESTIONS 1 & 2: 0
SUM OF ALL RESPONSES TO PHQ QUESTIONS 1-9: 0
2. FEELING DOWN, DEPRESSED OR HOPELESS: 0
SUM OF ALL RESPONSES TO PHQ QUESTIONS 1-9: 0

## 2021-08-30 NOTE — PROGRESS NOTES
Patient Name:  Carlyn Mullins  Patient :  1980  Patient MRN:  [id number]     Primary Oncologist: Diane Oneil MD  Referring Provider: Renu Carlson MD     Date of Service: 2021      Reason for Consult: To evaluate the patient with leukocytosis. Chief Complaint:    Chief Complaint   Patient presents with    New Patient     Patient Active Problem List:     Restless leg syndrome     Mixed hyperlipidemia     Over weight     Leukocytosis (leucocytosis)    HPI:   Carlyn Mullins is a 41-year-old very pleasant female with medical history significant for hyperlipidemia, anxiety, chronic back pain and restless leg syndrome, referred to me on 2021 for evaluation of her leukocytosis. She stated that she was found to have persistent relative monocytosis since May 2016. Repeat blood test on May 2021 and 2021 showed relative monocytosis as well as leukocytosis [WBC 13.6 and 14.2 respectively]. She does not have anemia, erythrocytosis, thrombocytopenia or thrombocytosis. Since she is found to have persistent relative monocytosis with leukocytosis lately, she was referred to me for further evaluation. She denies frequent infection or recent infection. She does not have any significant symptoms. Past Medical History:     Significant for  1. Hyperlipidemia  2. Anxiety disorder  3. Chronic back pain  4. Restless leg syndrome    Past Surgery History:    Significant for  1. LEEP procedure  2. Dilatation and curettage  3. Pelvic laparoscopy for endometriosis  4. Urethral dilatation for recurrent urine tract infection    Social History:   She is a current smoker and she smoked approximately 10 to 20 cigarettes/day since she was 33. Occasionally drinks alcohol and she denies illicit drug abuse. Family History:    Significant for small cell lung cancer in her paternal grandmother, coronary artery disease in her father and brother.   Her father and grandmother has hypertension. No Known Allergies    Current Outpatient Medications on File Prior to Visit   Medication Sig Dispense Refill    aspirin 81 MG EC tablet Take 81 mg by mouth daily      Multiple Vitamin (MULTIVITAMIN ADULT PO) Take 2 tablets by mouth daily       Omega-3 Fatty Acids (FISH OIL) 500 MG CAPS Take by mouth       No current facility-administered medications on file prior to visit. Review of Systems:  Constitutional:  No weight loss, No fever, No chills, No night sweats. Energy level good. Eyes:  No diplopia, No transient or permanent loss of vision, No scotomata. ENT / Mouth:  No epistaxis, No dysphagia, No hoarseness, No oral ulcers, No gingival bleeding. No sore throat, No postnasal drip, No nasal drip, No mouth pain, No sinus pain, No tinnitus, Normal hearing. Cardiovascular:  No chest pain, No palpitations, No syncope, No upper extremity edema, No lower extremity edema, No calf discomfort. Respiratory:  No cough. No hemoptysis, No pleurisy, No wheezing, No dyspnea. Breast:  No breast mass, No pain, No nipple discharge, No change in size, No change in shape. Gastrointestinal:  No abdominal pain, No abdominal cramping, No nausea, No vomiting, No constipation, No diarrhea, No hematochezia, No melena, No jaundice, No dyspepsia, No dysphagia. Urinary:  No dysuria, No hematuria, No urinary incontinence. Gynecological:  No vaginal discharge, No suprapubic pain, No abnormal vaginal bleeding. Musculoskeletal:  No muscle pain, No swollen joints, No joint redness, No bone pain, No spine tenderness. Skin:  No rash, No nodules, No pruritus, No lesions. Neurologic:  No confusion, No seizures, No syncope, No tremor, No speech change, No headache, No hiccups, No abnormal gait, No sensory changes, No weakness. Psychiatric:  No depression, No anxiety, Concentration normal.  Endocrine:  No polyuria, No polydipsia, No hot flashes, No thyroid symptoms.   Hematologic:  No epistaxis, No gingival bleeding, No petechiae, No ecchymosis. Lymphatic:  No lymphadenopathy, No lymphedema. Allergy / Immunologic:  No eczema, No frequent mucous infections, No frequent respiratory infections, No recurrent urticarial, No frequent skin infections. Vital Signs: /66 (Site: Left Upper Arm, Position: Sitting, Cuff Size: Medium Adult)   Pulse 71   Temp 96.6 °F (35.9 °C) (Infrared)   Ht 4' 11\" (1.499 m)   Wt 155 lb 9.6 oz (70.6 kg)   SpO2 99%   BMI 31.43 kg/m²      Physical Exam:  CONSTITUTIONAL: awake, alert, cooperative, no apparent distress   EYES: pupils equal, round and reactive to light, sclera clear, normal conjunctiva  ENT: Normocephalic, without obvious abnormality, atraumatic  NECK: supple, symmetrical, no jugular venous distension, no carotid bruits   HEMATOLOGIC/LYMPHATIC: no cervical, supraclavicular or axillary lymphadenopathy   LUNGS: VBS, no wheezes, no crackles, no rhonchi, no increased work of breathing, clear to auscultation   CARDIOVASCULAR: regular rate and rhythm, normal S1 and S2, no murmur noted  ABDOMEN: normal bowel sounds x 4, soft, non-distended, non-tender, no masses palpated, no hepatosplenomegaly   MUSCULOSKELETAL: full range of motion noted, tone is normal  NEUROLOGIC: awake, alert, oriented to name, place and time. Motor skills grossly intact. SKIN: appears intact, normal skin color, normal texture, normal turgor, no jaundice.    EXTREMITIES: no LE edema, no leg swelling, no cyanosis, no clubbing,       Labs:  Hematology:  Lab Results   Component Value Date    WBC 11.7 (H) 08/30/2021    RBC 4.62 08/30/2021    HGB 14.1 08/30/2021    HCT 40.7 08/30/2021    MCV 88.1 08/30/2021    MCH 30.5 08/30/2021    MCHC 34.6 08/30/2021    RDW 13.0 08/30/2021     08/30/2021    MPV 10.0 08/30/2021    SEGSPCT 63.7 08/30/2021    EOSRELPCT 1.5 08/30/2021    BASOPCT 0.2 08/30/2021    LYMPHOPCT 28.9 08/30/2021    MONOPCT 5.7 (H) 08/30/2021    SEGSABS 7.4 08/30/2021    EOSABS 0.2 08/30/2021 BASOSABS 0.0 08/30/2021    LYMPHSABS 3.4 08/30/2021    MONOSABS 0.7 08/30/2021    DIFFTYPE AUTOMATED DIFFERENTIAL 08/30/2021     No results found for: ESR  Chemistry:  Lab Results   Component Value Date     05/25/2021    K 4.2 05/25/2021     05/25/2021    CO2 20 (L) 05/25/2021    BUN 12 05/25/2021    CREATININE 0.6 05/25/2021    CALCIUM 9.3 05/25/2021    PROT 6.7 05/25/2021    LABALBU 4.5 05/25/2021    BILITOT 0.5 05/25/2021    ALKPHOS 64 05/25/2021    AST 17 05/25/2021    ALT 15 05/25/2021    LABGLOM >60 05/25/2021    GFRAA >60 05/25/2021     No results found for: MMA, LDH, HOMOCYSTEINE  No components found for: LD  Lab Results   Component Value Date    TSHHS 0.930 05/25/2021     Immunology:  Lab Results   Component Value Date    PROT 6.7 05/25/2021     No results found for: Floyce Milder, KLFLCR  No results found for: B2M  Coagulation Panel:  No results found for: PROTIME, INR, APTT, DDIMER  Anemia Panel:  No results found for: DQDBBSXS32, FOLATE  Tumor Markers:  No results found for: , CEA, , LABCA2, PSA     Observations:  PHQ-9 Total Score: 0 (8/30/2021  2:33 PM)     Assessment   Leukocytosis    Plan:                                                                                                Abe Mortimer is a 43-year-old very pleasant female who was found to have persistent relative monocytosis since May 2016. Repeat blood test on May 2021 and July 2021 showed relative monocytosis as well as leukocytosis [WBC 13.6 and 14.2 respectively]. She does not have anemia, erythrocytosis, thrombocytopenia or thrombocytosis. She is a current smoker and she smoked approximately 10 to 20 cigarettes/day since she was 33. I believe her leukocytosis is most likely due to chronic cigarette smoking. However, I recommend to send proper work-ups today to rule out myeloproliferative neoplasm.   I will request CBC with differential, LDH, ESR, DULCE MARIA, BCRABL 1, flow cytometry, JAK2 [id number], JAK2 exon 1214, MPL and CALR. If all the above test results are normal, I will recommend for observation. I recommend her to cut down and quit cigarette smoking since it is most likely be the cause of her leukocytosis. I answered all her questions and concerns for today. I asked her to follow up with primary care physician on regular basis. I will continue to keep you updated on her progress. Thank you for allowing me to participate in the care of this very pleasant patient. Recent imaging and labs were reviewed and discussed with the patient.

## 2021-08-30 NOTE — PROGRESS NOTES
MA Rooming Questions  Patient: Cassidy Mckeon  MRN: [id number]    Date: 8/30/2021        NP      5. Did the patient have a depression screening completed today?  Yes    PHQ-9 Total Score: 0 (8/30/2021  2:33 PM)       PHQ-9 Given to (if applicable):               PHQ-9 Score (if applicable):                     [] Positive     [x]  Negative              Does question #9 need addressed (if applicable)                     [] Yes    []  No               Ángel Miller CMA

## 2021-08-31 ENCOUNTER — PROCEDURE VISIT (OUTPATIENT)
Dept: CARDIOLOGY CLINIC | Age: 41
End: 2021-08-31
Payer: COMMERCIAL

## 2021-08-31 DIAGNOSIS — R06.02 SHORTNESS OF BREATH: ICD-10-CM

## 2021-08-31 DIAGNOSIS — R06.02 SHORTNESS OF BREATH: Primary | ICD-10-CM

## 2021-08-31 LAB
LV EF: 58 %
LVEF MODALITY: NORMAL

## 2021-08-31 PROCEDURE — 93306 TTE W/DOPPLER COMPLETE: CPT | Performed by: INTERNAL MEDICINE

## 2021-08-31 PROCEDURE — 93015 CV STRESS TEST SUPVJ I&R: CPT | Performed by: INTERNAL MEDICINE

## 2021-08-31 NOTE — LETTER
Abdi            [address], [address], [facility], [address] [facility]  [address], [address]    INFORMED CONSENT FOR EXERCISE STRESS TEST    Patient Name:Radha Montalvo      :1980      IJA#:[id number]    Purpose & Explanation  Today I will perform an exercise test to determine my exercise capacity & state of cardiovascular health. I understand that the test will be performed on a motor-driven treadmill with the amount of effort gradually increasing. The exercise intensity will begin at a level one can easily accomplish & will be advanced in stages depending on my fitness level. During the exercise test, I consent to the monitoring of EKG, blood pressure, & expressed feelings of discomfort or effort. I have been informed that I can stop the test at any time because of signs of discomfort or fatigue. Risks & Discomforts   There exists the possibility of certain changes occurring during the test. They include:  abnormal blood pressure, fainting, disorder of the heart beat, & in rare instances, heart attack, stroke, or death. An added risk is that of self injury by tripping or falling while on the motorized treadmill. Every effort will be made to minimize these risks by evaluation of preliminary information relating to my health, fitness, & observations during the testing. Emergency equipment & trained personnel are available to deal with the unusual situations should they occur. I understand the possibility of these risks,  but it is my desire to proceed with taking this test as herein indicated. Responsibilities of the patient  Information you possess about your health status or previous experience of unusual feelings with physical effort may affect the safety & value of your exercise test. Your prompt reporting of feelings with effort during the exercise test itself, are also of great importance.  You are responsible to fully disclose such information when requested by the testing staff. Benefits to be Expected   The results obtained from the exercise test may assist in the diagnosis of your illness or in evaluating what type of physical activities you might do with low risk of harm. Inquires  Any questions about the procedures used in the exercise test or in the measurement of functional capacity are encouraged. If you have any doubts or questions, please ask us for further explanations. The information which is obtained will be treated as privileged, confidential, & will not be released or revealed to any such person, except my physician without my written consent. The information obtained, however, may be used for a statistical or scientific purpose with my right to privacy retained. Freedom of consents  Your permission to perform this exercise test is voluntary. You are free to deny consent or stop the test at any point, if you so desire. I HAVE READ THIS FORM & I UNDERSTAND THE TEST PROCEDURES THAT I WILL PERFORM. I CONSENT TO PARTICIPATE IN THIS TEST. SIGNATURE OF PATIENT:_____________________________________DATE: _____________    Belvin  WITNESS:____________________________________DATE: _____________      REGULAR TREADMILL STRESS TEST   Date:______________    Patient Ruby Mckenna   :1980  ITV#:[id number]  Doctor:Dr. Gisela Freeman  Age:40 y.o. Sex:female    Allergies:Patient has no known allergies.     Ht:  59\"    Wt: 155 lbs     Medical History:                                             **Indications for Test: CP, SOB  Past Medical History:   Diagnosis Date    Allergic rhinitis     Anxiety     Chronic back pain     Endometriosis     Hyperlipidemia      Chest Pain Character: DENIES Burning  Pounding  Dull Ache/Discomfort     Squeezing  Pressure Twinge Sharp/Stab    Palpitations Heaviness  Soreness SOB/Tightness   Location: Substernal  Anterior Chest  Precordial Pain  Other   Radiation:  None  Left   Arm Right   Arm Lt. Rt.    Jaw    Lt. Rt. Neck Back  Right Shoulder  Left Shoulder   Precip. Factors: None  Exertion  Exercise  Stress     Eating  Other      Relieved By:  Rest  NTG Antacids  Other                                                    **COMMENTS DURING STRESS**  Chest Pain:   Arrhythmia:   ST Changes:   Symptoms:                         SOB  Fatigue  Flushing/Diaphoresis  Palpitations  Dizziness  Sx's Resolved      THR Achieved:           YES        NO              Exercise Effort:          Poor        Fair         Good          Excellent            B-Blocker Held:          YES        NO          N/A          **Beta-Blocker: None   Doni-dur Held:            YES        NO          N/A      METS:                                         %MPHR:                                  **THR: _____________**        Pre BP:  Recovery B/P:  Recovery HR:   Post BP:  2 min BP:    Resting HR:  3 min BP:    Max. HR: 4 min BP:     Tot. Exer. Time:             min Max. Speed:  Elevation:                         %           Stage  MPH Grade Time B/P HR   I 1.7 10%      II 2.5 12%      III 3.4 14%      IV 4.2 16%      V 5 18%        .

## 2021-09-02 LAB — ANTI-NUCLEAR ANTIBODY (ANA): NORMAL

## 2021-09-23 ENCOUNTER — CLINICAL DOCUMENTATION (OUTPATIENT)
Dept: ONCOLOGY | Age: 41
End: 2021-09-23

## 2021-09-23 ENCOUNTER — TELEPHONE (OUTPATIENT)
Dept: ONCOLOGY | Age: 41
End: 2021-09-23

## 2021-09-23 NOTE — PROGRESS NOTES
Talked to patient who requested lab results. Informed that lab results were not all back. Patient is going to reschedule her OV another week out. Transferred call to .

## 2021-09-23 NOTE — TELEPHONE ENCOUNTER
Patient wants to cancel appt with Dr Corwin Velásquez on 9/23/21. She states she would like to talk to the nurse regarding her blood test results. She stated she is self pay and if she does not have any issues with her bloodwork she does not want to have to come back and pay for another follow up appt.

## 2021-09-29 ENCOUNTER — OFFICE VISIT (OUTPATIENT)
Dept: CARDIOLOGY CLINIC | Age: 41
End: 2021-09-29
Payer: COMMERCIAL

## 2021-09-29 VITALS
HEART RATE: 80 BPM | SYSTOLIC BLOOD PRESSURE: 124 MMHG | DIASTOLIC BLOOD PRESSURE: 86 MMHG | BODY MASS INDEX: 31.49 KG/M2 | HEIGHT: 59 IN | WEIGHT: 156.2 LBS

## 2021-09-29 DIAGNOSIS — R06.02 SHORTNESS OF BREATH: Primary | ICD-10-CM

## 2021-09-29 PROCEDURE — 99214 OFFICE O/P EST MOD 30 MIN: CPT | Performed by: INTERNAL MEDICINE

## 2021-09-29 RX ORDER — MULTIVIT-MIN/IRON/FOLIC ACID/K 18-600-40
CAPSULE ORAL
COMMUNITY

## 2021-09-29 RX ORDER — MULTIVIT-MIN/IRON/FOLIC ACID/K 18-600-40
2 CAPSULE ORAL DAILY
COMMUNITY

## 2021-09-29 NOTE — PROGRESS NOTES
Current Every Day Smoker     Packs/day: 1.00    Smokeless tobacco: Never Used    Tobacco comment: 0.5-1 pack a day   Substance Use Topics    Alcohol use: Yes     Alcohol/week: 0.0 standard drinks     Comment: drinks 1 cup coffee daily       [unfilled]  Review of Systems:   · Constitutional: No Fever or Weight Loss   · Eyes: No Decreased Vision  · ENT: No Headaches, Hearing Loss or Vertigo  · Cardiovascular: No chest pain, some dyspnea on exertion,+ palpitations or loss of consciousness  · Respiratory: No cough or wheezing    · Gastrointestinal: No abdominal pain, appetite loss, blood in stools, constipation, diarrhea or heartburn  · Genitourinary: No dysuria, trouble voiding, or hematuria  · Musculoskeletal:  No gait disturbance, weakness or joint complaints  · Integumentary: No rash or pruritis  · Neurological: No TIA or stroke symptoms  · Psychiatric: No anxiety or depression  · Endocrine: No malaise, fatigue or temperature intolerance  · Hematologic/Lymphatic: No bleeding problems, blood clots or swollen lymph nodes  · Allergic/Immunologic: No nasal congestion or hives  All systems negative except as marked. Objective:  /86   Pulse 80   Ht 4' 11\" (1.499 m)   Wt 156 lb 3.2 oz (70.9 kg)   BMI 31.55 kg/m²   Wt Readings from Last 3 Encounters:   09/29/21 156 lb 3.2 oz (70.9 kg)   08/30/21 155 lb 9.6 oz (70.6 kg)   08/11/21 156 lb (70.8 kg)     Body mass index is 31.55 kg/m². GENERAL - Alert, oriented, pleasant, in no apparent distress,normal grooming  HEENT  pupils are intact, cornea intact, conjunctive normal color, ears are normal in exam,  Neck - Supple. No jugular venous distention noted. No carotid bruits, no apical -carotid delay  Respiratory:  Normal breath sounds, No respiratory distress, No wheezing, No chest tenderness. ,no use of accessory muscles, diaphragm movement is normal  Cardiovascular: (PMI) apex non displaced,no lifts no thrills, no s3,no s4, Normal heart rate, Normal rhythm, No murmurs, No rubs, No gallops. Carotid arteries pulse and amplitude are normal no bruit, no abdominal bruit noted ( normal abdominal aorta ausculation),   Extremities - No cyanosis, clubbing, or significant edema, no varicose veins    Abdomen  No masses, tenderness, or organomegaly, no hepato-splenomegally, no bruits  Musculoskeletal  trace  edema, no kyphosis or scoliosis, no deformity in any extremity noted, muscle strength and tone are normal  Skin: no ulcer,no scar,no stasis dermatitis   Neurologic  alert oriented times 3,Cranial nerves II through XII are grossly intact. There were no gross focal neurologic abnormalities. Psychiatric: normal mood and affect    No results found for: CKTOTAL, CKMB, CKMBINDEX, TROPONINI  BNP:  No results found for: BNP  PT/INR:  No results found for: PTINR  Lab Results   Component Value Date    LABA1C 5.0 08/24/2017     Lab Results   Component Value Date    CHOL 182 05/10/2016    TRIG 132 05/10/2016    HDL 51 05/25/2021    LDLDIRECT 176 (H) 05/25/2021     Lab Results   Component Value Date    ALT 16 08/30/2021    AST 18 08/30/2021     TSH:  No results found for: TSH    Impression:  Radha is a 42 y. o.year old who  has a past medical history of Allergic rhinitis, Anxiety, Chronic back pain, Endometriosis, and Hyperlipidemia. and presents with     Plan:  1. Palpitations, R/O WPW, 30 days event monitor reviewed, max heart rate was 160bpm, , she has apple watch and some time her heart rate goes upto 168 bpm at rest, she has history of short [place] segment, has slight slurring of R wave,   2. Resolved Covid 19  3. Chest pain:resolved, her stress test and echo wnl, rsvp IS 43 mMhg, recommend to stop smoking  4. Dyslpidemia\":continue fish oil  5. Leg swelling: recommend to use compression socks  6.  Possible EBV, INFECTIOUS mono nucleosis:  7. agree with Health maintenance: exerise and diet  All labs, medications and tests reviewed, continue all other medications of all above medical condition listed as is.     [unfilled]

## 2021-10-03 NOTE — PROGRESS NOTES
Patient Name:  Erasmo Salazar  Patient :  1980  Patient MRN:  [id number]     Primary Oncologist: Miko Fragoso MD  Referring Provider: Yenny Taylor MD     Date of Service: 10/7/2021      Reason for Consult: To evaluate the patient with leukocytosis. Chief Complaint:    Chief Complaint   Patient presents with    Follow-up     Patient Active Problem List:     Restless leg syndrome     Mixed hyperlipidemia     Over weight     Leukocytosis (leucocytosis)    HPI:   Erasmo Salazar is a 76-year-old very pleasant female with medical history significant for hyperlipidemia, anxiety, chronic back pain and restless leg syndrome, referred to me on 2021 for evaluation of her leukocytosis. She stated that she was found to have persistent relative monocytosis since May 2016. Repeat blood test on May 2021 and 2021 showed relative monocytosis as well as leukocytosis [WBC 13.6 and 14.2 respectively]. She does not have anemia, erythrocytosis, thrombocytopenia or thrombocytosis. Since she is found to have persistent relative monocytosis with leukocytosis lately, she was referred to me for further evaluation. Flow cytometry done on 2021 showed rare precursor B cells detected. No other diagnostic immunophenotypic abnormalities detected. BCR/ABL-1 translocation was not detected. On 2021, she presented to me for follow-up. She was initially referred to me for evaluation of mild leukocytosis and relative monocytosis. On today visit, I reviewed with her findings on laboratory test.    She was found to have rare precursor B cells in flow cytometry. I believe it is of no clinical significant at this moment and I will repeat flow cytometry again in 6 months. I encouraged her to quit cigarette smoking. She denies frequent infection or recent infection. She does not have any significant symptoms. Past Medical History:     Significant for  1. Hyperlipidemia  2. Anxiety disorder  3. Chronic back pain  4. Restless leg syndrome    Past Surgery History:    Significant for  1. LEEP procedure  2. Dilatation and curettage  3. Pelvic laparoscopy for endometriosis  4. Urethral dilatation for recurrent urine tract infection    Social History:   She is a current smoker and she smoked approximately 10 to 20 cigarettes/day since she was 33. Occasionally drinks alcohol and she denies illicit drug abuse. Family History:    Significant for small cell lung cancer in her paternal grandmother, coronary artery disease in her father and brother. Her father and grandmother has hypertension. Allergies:  No known drug allergies. Review of Systems: \"Per interval history; otherwise 10 point ROS is negative. \"  Her energy level is good, appetite, and sleep are fine. She doesn't have fever, chills, night sweats, cough, shortness of breath, chest pain, hemoptysis or palpitations. She doesn't have neuropathy and she denies bleeding or clotting issues. She denies any pain on today's visit. No anxiety or depression. Her bowel and bladder functions are normal. She doesn't have nausea, vomiting, abdominal pain, diarrhea, constipation, dysuria, loss of appetite or weight loss. The rest of the systems are unremarkable.      Vital Signs: /65 (Site: Right Upper Arm, Position: Sitting, Cuff Size: Medium Adult)   Pulse 87   Temp 98 °F (36.7 °C) (Temporal)   Resp 18   Ht 4' 11\" (1.499 m)   Wt 157 lb 9.6 oz (71.5 kg)   SpO2 98%   BMI 31.83 kg/m²      Physical Exam:  CONSTITUTIONAL: awake, alert, cooperative, no apparent distress   EYES: pupils equal, round and reactive to light, sclera clear, normal conjunctiva  ENT: Normocephalic, without obvious abnormality, atraumatic  NECK: supple, symmetrical, no jugular venous distension, no carotid bruits   HEMATOLOGIC/LYMPHATIC: no cervical, supraclavicular or axillary lymphadenopathy   LUNGS: VBS, no wheezes, clear to auscultation, no crackles, no rhonchi, no increased work of breathing,    CARDIOVASCULAR: regular rate and rhythm, normal S1 and S2, no murmur noted  ABDOMEN: normal bowel sounds x 4, soft, non-distended, non-tender, no masses palpated, no hepatosplenomegaly   MUSCULOSKELETAL: full range of motion noted, tone is normal  NEUROLOGIC: awake, alert, oriented to name, place and time. Motor skills grossly intact. SKIN: appears intact, normal skin color, normal texture, normal turgor, no jaundice.    EXTREMITIES: no LE edema, no clubbing, no leg swelling, no cyanosis,       Labs:  Hematology:  Lab Results   Component Value Date    WBC 11.7 (H) 08/30/2021    RBC 4.62 08/30/2021    HGB 14.1 08/30/2021    HCT 40.7 08/30/2021    MCV 88.1 08/30/2021    MCH 30.5 08/30/2021    MCHC 34.6 08/30/2021    RDW 13.0 08/30/2021     08/30/2021    MPV 10.0 08/30/2021    SEGSPCT 63.7 08/30/2021    EOSRELPCT 1.5 08/30/2021    BASOPCT 0.2 08/30/2021    LYMPHOPCT 28.9 08/30/2021    MONOPCT 5.7 (H) 08/30/2021    SEGSABS 7.4 08/30/2021    EOSABS 0.2 08/30/2021    BASOSABS 0.0 08/30/2021    LYMPHSABS 3.4 08/30/2021    MONOSABS 0.7 08/30/2021    DIFFTYPE AUTOMATED DIFFERENTIAL 08/30/2021     Lab Results   Component Value Date    ESR 3 08/30/2021     Chemistry:  Lab Results   Component Value Date     08/30/2021    K 4.4 08/30/2021     08/30/2021    CO2 22 08/30/2021    BUN 9 08/30/2021    CREATININE 0.5 (L) 08/30/2021    GLUCOSE 85 08/30/2021    CALCIUM 9.8 08/30/2021    PROT 7.2 08/30/2021    LABALBU 4.8 08/30/2021    BILITOT 0.3 08/30/2021    ALKPHOS 80 08/30/2021    AST 18 08/30/2021    ALT 16 08/30/2021    LABGLOM >60 08/30/2021    GFRAA >60 08/30/2021     Lab Results   Component Value Date     08/30/2021     No components found for: LD  Lab Results   Component Value Date    TSHHS 0.930 05/25/2021     Immunology:  Lab Results   Component Value Date    PROT 7.2 08/30/2021     No results found for: KAPPAUVOL, LAMBDAUVOL, KLFLCR  No results found for: B2M  Coagulation Panel:  No results found for: PROTIME, INR, APTT, DDIMER  Anemia Panel:  No results found for: [id number], FOLATE  Tumor Markers:  No results found for: , CEA, , LABCA2, PSA     Observations:  No data recorded     Assessment   Leukocytosis    Plan:                                                                                                Susana Kruger is a 69-year-old very pleasant female who was found to have persistent relative monocytosis since May 2016. Repeat blood test on May 2021 and July 2021 showed relative monocytosis as well as leukocytosis [WBC 13.6 and 14.2 respectively]. She does not have anemia, erythrocytosis, thrombocytopenia or thrombocytosis. She is a current smoker and she smoked approximately 10 to 20 cigarettes/day since she was 33. Flow cytometry done on August 30, 2021 showed rare precursor B cells detected. No other diagnostic immunophenotypic abnormalities detected. BCR/ABL-1 translocation was not detected. On October 7, 2021, she presented to me for follow-up. She was initially referred to me for evaluation of mild leukocytosis and relative monocytosis. On today visit, I reviewed with her findings on laboratory test.    She was found to have rare precursor B cells in flow cytometry. I believe it is of no clinical significant at this moment and I will repeat flow cytometry again in 6 months. I encouraged her to quit cigarette smoking. She denies frequent infection or recent infection. I answered all her questions and concerns for today. I asked her to follow up with primary care physician on regular basis. Recent imaging and labs were reviewed and discussed with the patient.

## 2021-10-05 LAB
BCR/ABL T(9,22): NORMAL
COMMENT: NORMAL

## 2021-10-07 ENCOUNTER — OFFICE VISIT (OUTPATIENT)
Dept: ONCOLOGY | Age: 41
End: 2021-10-07
Payer: COMMERCIAL

## 2021-10-07 ENCOUNTER — HOSPITAL ENCOUNTER (OUTPATIENT)
Dept: INFUSION THERAPY | Age: 41
Discharge: HOME | End: 2021-10-07
Payer: COMMERCIAL

## 2021-10-07 VITALS
HEIGHT: 59 IN | SYSTOLIC BLOOD PRESSURE: 128 MMHG | RESPIRATION RATE: 18 BRPM | HEART RATE: 87 BPM | WEIGHT: 157.6 LBS | OXYGEN SATURATION: 98 % | BODY MASS INDEX: 31.77 KG/M2 | TEMPERATURE: 98 F | DIASTOLIC BLOOD PRESSURE: 65 MMHG

## 2021-10-07 DIAGNOSIS — D72.828 OTHER ELEVATED WHITE BLOOD CELL (WBC) COUNT: Primary | ICD-10-CM

## 2021-10-07 PROCEDURE — 99211 OFF/OP EST MAY X REQ PHY/QHP: CPT

## 2021-10-07 PROCEDURE — 99214 OFFICE O/P EST MOD 30 MIN: CPT | Performed by: INTERNAL MEDICINE

## 2021-10-07 NOTE — PROGRESS NOTES
MA Rooming Questions  Patient: Keri Cornell  MRN: [id number]    Date: 10/7/2021        1. Do you have any new issues?   no         2. Do you need any refills on medications?    no    3. Have you had any imaging done since your last visit?   no    4. Have you been hospitalized or seen in the emergency room since your last visit here?   no    5. Did the patient have a depression screening completed today?  No    No data recorded     PHQ-9 Given to (if applicable):               PHQ-9 Score (if applicable):                     [] Positive     []  Negative              Does question #9 need addressed (if applicable)                     [] Yes    []  No               Chito Padgett CMA

## 2025-06-03 SDOH — HEALTH STABILITY: PHYSICAL HEALTH: ON AVERAGE, HOW MANY MINUTES DO YOU ENGAGE IN EXERCISE AT THIS LEVEL?: 150+ MIN

## 2025-06-03 SDOH — HEALTH STABILITY: PHYSICAL HEALTH: ON AVERAGE, HOW MANY DAYS PER WEEK DO YOU ENGAGE IN MODERATE TO STRENUOUS EXERCISE (LIKE A BRISK WALK)?: 3 DAYS

## 2025-06-06 ENCOUNTER — OFFICE VISIT (OUTPATIENT)
Dept: FAMILY MEDICINE CLINIC | Age: 45
End: 2025-06-06
Payer: COMMERCIAL

## 2025-06-06 VITALS
HEART RATE: 87 BPM | BODY MASS INDEX: 29.17 KG/M2 | SYSTOLIC BLOOD PRESSURE: 122 MMHG | RESPIRATION RATE: 18 BRPM | OXYGEN SATURATION: 97 % | HEIGHT: 60 IN | DIASTOLIC BLOOD PRESSURE: 72 MMHG | WEIGHT: 148.6 LBS

## 2025-06-06 DIAGNOSIS — Z12.31 BREAST CANCER SCREENING BY MAMMOGRAM: ICD-10-CM

## 2025-06-06 DIAGNOSIS — M54.12 CERVICAL RADICULOPATHY AT C6: ICD-10-CM

## 2025-06-06 DIAGNOSIS — R53.82 CHRONIC FATIGUE: ICD-10-CM

## 2025-06-06 DIAGNOSIS — G89.29 CHRONIC NECK PAIN: ICD-10-CM

## 2025-06-06 DIAGNOSIS — F51.01 PRIMARY INSOMNIA: ICD-10-CM

## 2025-06-06 DIAGNOSIS — E78.2 MIXED HYPERLIPIDEMIA: ICD-10-CM

## 2025-06-06 DIAGNOSIS — Z76.89 ENCOUNTER TO ESTABLISH CARE WITH NEW DOCTOR: Primary | ICD-10-CM

## 2025-06-06 DIAGNOSIS — D72.828 OTHER ELEVATED WHITE BLOOD CELL (WBC) COUNT: ICD-10-CM

## 2025-06-06 DIAGNOSIS — Z12.11 COLON CANCER SCREENING: ICD-10-CM

## 2025-06-06 DIAGNOSIS — M54.2 CHRONIC NECK PAIN: ICD-10-CM

## 2025-06-06 PROCEDURE — G2211 COMPLEX E/M VISIT ADD ON: HCPCS | Performed by: PHYSICIAN ASSISTANT

## 2025-06-06 PROCEDURE — G8419 CALC BMI OUT NRM PARAM NOF/U: HCPCS | Performed by: PHYSICIAN ASSISTANT

## 2025-06-06 PROCEDURE — 99204 OFFICE O/P NEW MOD 45 MIN: CPT | Performed by: PHYSICIAN ASSISTANT

## 2025-06-06 PROCEDURE — 1036F TOBACCO NON-USER: CPT | Performed by: PHYSICIAN ASSISTANT

## 2025-06-06 PROCEDURE — G8427 DOCREV CUR MEDS BY ELIG CLIN: HCPCS | Performed by: PHYSICIAN ASSISTANT

## 2025-06-06 PROCEDURE — 95860 NEEDLE EMG 1 EXTREMITY: CPT | Performed by: PHYSICIAN ASSISTANT

## 2025-06-06 RX ORDER — DEXTROAMPHETAMINE SACCHARATE, AMPHETAMINE ASPARTATE MONOHYDRATE, DEXTROAMPHETAMINE SULFATE AND AMPHETAMINE SULFATE 5; 5; 5; 5 MG/1; MG/1; MG/1; MG/1
CAPSULE, EXTENDED RELEASE ORAL
COMMUNITY
Start: 2025-05-28

## 2025-06-06 RX ORDER — METHYLPREDNISOLONE 4 MG/1
TABLET ORAL
Qty: 1 KIT | Refills: 0 | Status: SHIPPED | OUTPATIENT
Start: 2025-06-06 | End: 2025-06-12

## 2025-06-06 SDOH — ECONOMIC STABILITY: FOOD INSECURITY: WITHIN THE PAST 12 MONTHS, YOU WORRIED THAT YOUR FOOD WOULD RUN OUT BEFORE YOU GOT MONEY TO BUY MORE.: NEVER TRUE

## 2025-06-06 SDOH — ECONOMIC STABILITY: FOOD INSECURITY: WITHIN THE PAST 12 MONTHS, THE FOOD YOU BOUGHT JUST DIDN'T LAST AND YOU DIDN'T HAVE MONEY TO GET MORE.: NEVER TRUE

## 2025-06-06 ASSESSMENT — PATIENT HEALTH QUESTIONNAIRE - PHQ9
SUM OF ALL RESPONSES TO PHQ QUESTIONS 1-9: 0
1. LITTLE INTEREST OR PLEASURE IN DOING THINGS: NOT AT ALL
SUM OF ALL RESPONSES TO PHQ QUESTIONS 1-9: 0
2. FEELING DOWN, DEPRESSED OR HOPELESS: NOT AT ALL
SUM OF ALL RESPONSES TO PHQ QUESTIONS 1-9: 0
SUM OF ALL RESPONSES TO PHQ QUESTIONS 1-9: 0

## 2025-06-06 NOTE — PROGRESS NOTES
questions or concerns please feel free to contact the dictating provider for clarification.    The patient (or guardian, if applicable) and other individuals in attendance with the patient were advised that Artificial Intelligence will be utilized during this visit to record, process the conversation to generate a clinical note, and support improvement of the AI technology. The patient (or guardian, if applicable) and other individuals in attendance at the appointment consented to the use of AI, including the recording.

## 2025-06-30 LAB — NONINV COLON CA DNA+OCC BLD SCRN STL QL: NEGATIVE

## 2025-07-14 ENCOUNTER — PATIENT MESSAGE (OUTPATIENT)
Dept: FAMILY MEDICINE CLINIC | Age: 45
End: 2025-07-14

## 2025-07-28 ENCOUNTER — HOSPITAL ENCOUNTER (OUTPATIENT)
Dept: MAMMOGRAPHY | Age: 45
Discharge: HOME | End: 2025-07-28
Payer: COMMERCIAL

## 2025-07-28 VITALS — WEIGHT: 148 LBS | BODY MASS INDEX: 29.06 KG/M2 | HEIGHT: 60 IN

## 2025-07-28 DIAGNOSIS — Z12.31 BREAST CANCER SCREENING BY MAMMOGRAM: ICD-10-CM

## 2025-07-28 PROCEDURE — 77067 SCR MAMMO BI INCL CAD: CPT

## 2025-08-07 ENCOUNTER — HOSPITAL ENCOUNTER (OUTPATIENT)
Dept: MAMMOGRAPHY | Age: 45
Discharge: HOME | End: 2025-08-07
Payer: COMMERCIAL

## 2025-08-07 ENCOUNTER — HOSPITAL ENCOUNTER (OUTPATIENT)
Dept: ULTRASOUND IMAGING | Age: 45
Discharge: HOME | End: 2025-08-07
Payer: COMMERCIAL

## 2025-08-07 ENCOUNTER — HOSPITAL ENCOUNTER (OUTPATIENT)
Dept: LAB | Age: 45
Discharge: HOME | End: 2025-08-07
Payer: COMMERCIAL

## 2025-08-07 DIAGNOSIS — D72.828 OTHER ELEVATED WHITE BLOOD CELL (WBC) COUNT: ICD-10-CM

## 2025-08-07 DIAGNOSIS — R53.82 CHRONIC FATIGUE: ICD-10-CM

## 2025-08-07 DIAGNOSIS — R92.8 ABNORMAL MAMMOGRAM OF RIGHT BREAST: ICD-10-CM

## 2025-08-07 DIAGNOSIS — E78.2 MIXED HYPERLIPIDEMIA: ICD-10-CM

## 2025-08-07 LAB
ALBUMIN SERPL-MCNC: 4.3 G/DL (ref 3.4–5)
ALBUMIN/GLOB SERPL: 1.7 {RATIO}
ALP SERPL-CCNC: 70 U/L (ref 40–129)
ALT SERPL-CCNC: 16 U/L (ref 10–40)
ANION GAP SERPL CALCULATED.3IONS-SCNC: 11 MMOL/L (ref 9–17)
AST SERPL-CCNC: 21 U/L (ref 15–37)
BASOPHILS # BLD: 0.03 K/UL
BASOPHILS NFR BLD: 0 % (ref 0–1)
BILIRUB SERPL-MCNC: <0.2 MG/DL (ref 0–1)
BUN SERPL-MCNC: 11 MG/DL (ref 7–20)
CALCIUM SERPL-MCNC: 9.2 MG/DL (ref 8.3–10.6)
CHLORIDE SERPL-SCNC: 104 MMOL/L (ref 99–110)
CHOLEST SERPL-MCNC: 206 MG/DL (ref 125–199)
CO2 SERPL-SCNC: 25 MMOL/L (ref 21–32)
CREAT SERPL-MCNC: 0.7 MG/DL (ref 0.6–1.1)
EOSINOPHIL # BLD: 0.24 K/UL
EOSINOPHILS RELATIVE PERCENT: 3 % (ref 0–3)
ERYTHROCYTE [DISTWIDTH] IN BLOOD BY AUTOMATED COUNT: 12.1 % (ref 11.7–14.9)
GFR, ESTIMATED: >90 ML/MIN/1.73M2
GLUCOSE SERPL-MCNC: 91 MG/DL (ref 74–99)
HCT VFR BLD AUTO: 39.9 % (ref 37–47)
HDLC SERPL-MCNC: 71 MG/DL
HGB BLD-MCNC: 13 G/DL (ref 12.5–16)
IMM GRANULOCYTES # BLD AUTO: 0.03 K/UL
IMM GRANULOCYTES NFR BLD: 0 %
LDLC SERPL CALC-MCNC: 110 MG/DL
LYMPHOCYTES NFR BLD: 2.99 K/UL
LYMPHOCYTES RELATIVE PERCENT: 40 % (ref 24–44)
MCH RBC QN AUTO: 29.8 PG (ref 27–31)
MCHC RBC AUTO-ENTMCNC: 32.6 G/DL (ref 32–36)
MCV RBC AUTO: 91.5 FL (ref 78–100)
MONOCYTES NFR BLD: 0.57 K/UL
MONOCYTES NFR BLD: 8 % (ref 0–5)
NEUTROPHILS NFR BLD: 48 % (ref 36–66)
NEUTS SEG NFR BLD: 3.59 K/UL
PLATELET # BLD AUTO: 358 K/UL (ref 140–440)
PMV BLD AUTO: 9.7 FL (ref 7.5–11.1)
POTASSIUM SERPL-SCNC: 4.4 MMOL/L (ref 3.5–5.1)
PROT SERPL-MCNC: 6.9 G/DL (ref 6.4–8.2)
RBC # BLD AUTO: 4.36 M/UL (ref 4.2–5.4)
SODIUM SERPL-SCNC: 140 MMOL/L (ref 136–145)
TRIGL SERPL-MCNC: 125 MG/DL
TSH SERPL DL<=0.05 MIU/L-ACNC: 1.26 UIU/ML (ref 0.27–4.2)
WBC OTHER # BLD: 7.5 K/UL (ref 4–10.5)

## 2025-08-07 PROCEDURE — 76642 ULTRASOUND BREAST LIMITED: CPT

## 2025-08-07 PROCEDURE — 80061 LIPID PANEL: CPT

## 2025-08-07 PROCEDURE — 36415 COLL VENOUS BLD VENIPUNCTURE: CPT

## 2025-08-07 PROCEDURE — 80053 COMPREHEN METABOLIC PANEL: CPT

## 2025-08-07 PROCEDURE — 85025 COMPLETE CBC W/AUTO DIFF WBC: CPT

## 2025-08-07 PROCEDURE — 77065 DX MAMMO INCL CAD UNI: CPT

## 2025-08-07 PROCEDURE — 84443 ASSAY THYROID STIM HORMONE: CPT
